# Patient Record
Sex: FEMALE | Race: WHITE | NOT HISPANIC OR LATINO | URBAN - METROPOLITAN AREA
[De-identification: names, ages, dates, MRNs, and addresses within clinical notes are randomized per-mention and may not be internally consistent; named-entity substitution may affect disease eponyms.]

---

## 2018-01-30 ENCOUNTER — FOLLOW UP ESTABLISHED (OUTPATIENT)
Dept: URBAN - METROPOLITAN AREA CLINIC 51 | Facility: CLINIC | Age: 68
End: 2018-01-30
Payer: MEDICARE

## 2018-01-30 DIAGNOSIS — H40.033 ANATOMICAL NARROW ANGLE, BILATERAL: ICD-10-CM

## 2018-01-30 DIAGNOSIS — H40.053 OCULAR HYPERTENSION, BILATERAL: ICD-10-CM

## 2018-01-30 DIAGNOSIS — H04.123 DRY EYE SYNDROME OF BILATERAL LACRIMAL GLANDS: ICD-10-CM

## 2018-01-30 PROCEDURE — 92134 CPTRZ OPH DX IMG PST SGM RTA: CPT | Performed by: OPTOMETRIST

## 2018-01-30 PROCEDURE — 92014 COMPRE OPH EXAM EST PT 1/>: CPT | Performed by: OPTOMETRIST

## 2018-01-30 PROCEDURE — 92083 EXTENDED VISUAL FIELD XM: CPT | Performed by: OPTOMETRIST

## 2018-01-30 RX ORDER — BRIMONIDINE TARTRATE 2 MG/ML
0.2 % SOLUTION/ DROPS OPHTHALMIC
Qty: 3 | Refills: 3 | Status: INACTIVE
Start: 2018-01-30 | End: 2018-04-06

## 2018-01-30 ASSESSMENT — INTRAOCULAR PRESSURE
OS: 21
OD: 27

## 2018-01-30 ASSESSMENT — VISUAL ACUITY
OD: 20/30
OS: 20/30

## 2018-01-30 ASSESSMENT — KERATOMETRY
OS: 45.27
OD: 46.17

## 2018-03-28 ENCOUNTER — CONSULT (OUTPATIENT)
Dept: URBAN - METROPOLITAN AREA CLINIC 51 | Facility: CLINIC | Age: 68
End: 2018-03-28
Payer: MEDICARE

## 2018-03-28 PROCEDURE — 67028 INJECTION EYE DRUG: CPT | Performed by: OPHTHALMOLOGY

## 2018-03-28 PROCEDURE — 92014 COMPRE OPH EXAM EST PT 1/>: CPT | Performed by: OPHTHALMOLOGY

## 2018-03-28 PROCEDURE — 92134 CPTRZ OPH DX IMG PST SGM RTA: CPT | Performed by: OPHTHALMOLOGY

## 2018-03-28 PROCEDURE — 92235 FLUORESCEIN ANGRPH MLTIFRAME: CPT | Performed by: OPHTHALMOLOGY

## 2018-03-28 ASSESSMENT — INTRAOCULAR PRESSURE
OS: 17
OD: 22

## 2018-04-06 ENCOUNTER — FOLLOW UP ESTABLISHED (OUTPATIENT)
Dept: URBAN - METROPOLITAN AREA CLINIC 44 | Facility: CLINIC | Age: 68
End: 2018-04-06
Payer: MEDICARE

## 2018-04-06 PROCEDURE — 92134 CPTRZ OPH DX IMG PST SGM RTA: CPT | Performed by: OPHTHALMOLOGY

## 2018-04-06 PROCEDURE — 67210 TREATMENT OF RETINAL LESION: CPT | Performed by: OPHTHALMOLOGY

## 2018-04-06 ASSESSMENT — INTRAOCULAR PRESSURE
OD: 17
OS: 12

## 2018-04-24 ENCOUNTER — FOLLOW UP ESTABLISHED (OUTPATIENT)
Dept: URBAN - METROPOLITAN AREA CLINIC 44 | Facility: CLINIC | Age: 68
End: 2018-04-24
Payer: MEDICARE

## 2018-04-24 PROCEDURE — 92134 CPTRZ OPH DX IMG PST SGM RTA: CPT | Performed by: OPHTHALMOLOGY

## 2018-04-24 PROCEDURE — 67028 INJECTION EYE DRUG: CPT | Performed by: OPHTHALMOLOGY

## 2018-04-24 ASSESSMENT — INTRAOCULAR PRESSURE
OD: 19
OS: 12

## 2018-07-10 ENCOUNTER — FOLLOW UP ESTABLISHED (OUTPATIENT)
Dept: URBAN - METROPOLITAN AREA CLINIC 44 | Facility: CLINIC | Age: 68
End: 2018-07-10
Payer: MEDICARE

## 2018-07-10 DIAGNOSIS — H25.13 AGE-RELATED NUCLEAR CATARACT, BILATERAL: ICD-10-CM

## 2018-07-10 PROCEDURE — 92235 FLUORESCEIN ANGRPH MLTIFRAME: CPT | Performed by: OPHTHALMOLOGY

## 2018-07-10 PROCEDURE — 92134 CPTRZ OPH DX IMG PST SGM RTA: CPT | Performed by: OPHTHALMOLOGY

## 2018-07-10 PROCEDURE — 92014 COMPRE OPH EXAM EST PT 1/>: CPT | Performed by: OPHTHALMOLOGY

## 2018-07-10 ASSESSMENT — INTRAOCULAR PRESSURE
OD: 15
OS: 14

## 2018-11-08 ENCOUNTER — FOLLOW UP ESTABLISHED (OUTPATIENT)
Dept: URBAN - METROPOLITAN AREA CLINIC 44 | Facility: CLINIC | Age: 68
End: 2018-11-08
Payer: MEDICARE

## 2018-11-08 PROCEDURE — 92134 CPTRZ OPH DX IMG PST SGM RTA: CPT | Performed by: OPHTHALMOLOGY

## 2018-11-08 PROCEDURE — 67028 INJECTION EYE DRUG: CPT | Performed by: OPHTHALMOLOGY

## 2018-11-08 PROCEDURE — 92235 FLUORESCEIN ANGRPH MLTIFRAME: CPT | Performed by: OPHTHALMOLOGY

## 2018-11-08 PROCEDURE — 92014 COMPRE OPH EXAM EST PT 1/>: CPT | Performed by: OPHTHALMOLOGY

## 2018-11-08 ASSESSMENT — INTRAOCULAR PRESSURE
OS: 17
OD: 19

## 2018-11-21 ENCOUNTER — FOLLOW UP ESTABLISHED (OUTPATIENT)
Dept: URBAN - METROPOLITAN AREA CLINIC 44 | Facility: CLINIC | Age: 68
End: 2018-11-21
Payer: MEDICARE

## 2018-11-21 PROCEDURE — 92134 CPTRZ OPH DX IMG PST SGM RTA: CPT | Performed by: OPHTHALMOLOGY

## 2018-11-21 PROCEDURE — 67210 TREATMENT OF RETINAL LESION: CPT | Performed by: OPHTHALMOLOGY

## 2018-11-21 ASSESSMENT — INTRAOCULAR PRESSURE
OS: 17
OD: 21

## 2019-01-21 ENCOUNTER — FOLLOW UP ESTABLISHED (OUTPATIENT)
Dept: URBAN - METROPOLITAN AREA CLINIC 44 | Facility: CLINIC | Age: 69
End: 2019-01-21
Payer: MEDICARE

## 2019-01-21 PROCEDURE — 92133 CPTRZD OPH DX IMG PST SGM ON: CPT | Performed by: OPTOMETRIST

## 2019-01-21 PROCEDURE — 92014 COMPRE OPH EXAM EST PT 1/>: CPT | Performed by: OPTOMETRIST

## 2019-01-21 RX ORDER — LATANOPROST 50 UG/ML
0.005 % SOLUTION OPHTHALMIC
Qty: 3 | Refills: 3 | Status: INACTIVE
Start: 2019-01-21 | End: 2020-02-26

## 2019-01-21 ASSESSMENT — KERATOMETRY
OS: 45.88
OD: 45.75

## 2019-01-21 ASSESSMENT — VISUAL ACUITY
OS: 20/40
OD: 20/40

## 2019-01-21 ASSESSMENT — INTRAOCULAR PRESSURE
OS: 28
OD: 31

## 2019-02-18 ENCOUNTER — FOLLOW UP ESTABLISHED (OUTPATIENT)
Dept: URBAN - METROPOLITAN AREA CLINIC 44 | Facility: CLINIC | Age: 69
End: 2019-02-18
Payer: MEDICARE

## 2019-02-18 DIAGNOSIS — H34.8322 TRIBUTARY (BRANCH) RETINAL VEIN OCCLUSION, LEFT EYE, STABLE: Primary | ICD-10-CM

## 2019-02-18 PROCEDURE — 92083 EXTENDED VISUAL FIELD XM: CPT | Performed by: OPTOMETRIST

## 2019-02-18 PROCEDURE — 92014 COMPRE OPH EXAM EST PT 1/>: CPT | Performed by: OPTOMETRIST

## 2019-02-18 PROCEDURE — 92250 FUNDUS PHOTOGRAPHY W/I&R: CPT | Performed by: OPTOMETRIST

## 2019-02-18 ASSESSMENT — VISUAL ACUITY
OD: 20/30
OS: 20/40

## 2019-02-18 ASSESSMENT — INTRAOCULAR PRESSURE
OS: 20
OD: 19

## 2019-02-18 ASSESSMENT — KERATOMETRY
OD: 45.50
OS: 45.38

## 2019-03-19 ENCOUNTER — FOLLOW UP ESTABLISHED (OUTPATIENT)
Dept: URBAN - METROPOLITAN AREA CLINIC 44 | Facility: CLINIC | Age: 69
End: 2019-03-19
Payer: MEDICARE

## 2019-03-19 PROCEDURE — 92235 FLUORESCEIN ANGRPH MLTIFRAME: CPT | Performed by: OPHTHALMOLOGY

## 2019-03-19 PROCEDURE — 92134 CPTRZ OPH DX IMG PST SGM RTA: CPT | Performed by: OPHTHALMOLOGY

## 2019-03-19 PROCEDURE — 67028 INJECTION EYE DRUG: CPT | Performed by: OPHTHALMOLOGY

## 2019-03-19 PROCEDURE — 92014 COMPRE OPH EXAM EST PT 1/>: CPT | Performed by: OPHTHALMOLOGY

## 2019-03-19 ASSESSMENT — INTRAOCULAR PRESSURE
OD: 17
OS: 18

## 2019-03-26 ENCOUNTER — FOLLOW UP ESTABLISHED (OUTPATIENT)
Dept: URBAN - METROPOLITAN AREA CLINIC 44 | Facility: CLINIC | Age: 69
End: 2019-03-26
Payer: MEDICARE

## 2019-03-26 PROCEDURE — 67210 TREATMENT OF RETINAL LESION: CPT | Performed by: OPHTHALMOLOGY

## 2019-03-26 PROCEDURE — 92134 CPTRZ OPH DX IMG PST SGM RTA: CPT | Performed by: OPHTHALMOLOGY

## 2019-03-26 ASSESSMENT — INTRAOCULAR PRESSURE
OS: 11
OD: 12

## 2019-04-05 ENCOUNTER — Encounter (OUTPATIENT)
Dept: URBAN - METROPOLITAN AREA CLINIC 44 | Facility: CLINIC | Age: 69
End: 2019-04-05
Payer: MEDICARE

## 2019-04-05 DIAGNOSIS — Z01.818 ENCOUNTER FOR OTHER PREPROCEDURAL EXAMINATION: Primary | ICD-10-CM

## 2019-04-05 PROCEDURE — 92025 CPTRIZED CORNEAL TOPOGRAPHY: CPT | Performed by: OPHTHALMOLOGY

## 2019-04-05 PROCEDURE — 99213 OFFICE O/P EST LOW 20 MIN: CPT | Performed by: PHYSICIAN ASSISTANT

## 2019-04-05 RX ORDER — ATORVASTATIN CALCIUM 10 MG/1
10 MG TABLET, FILM COATED ORAL
Qty: 0 | Refills: 0 | Status: INACTIVE
Start: 2019-04-05 | End: 2019-04-05

## 2019-04-08 ENCOUNTER — FOLLOW UP ESTABLISHED (OUTPATIENT)
Dept: URBAN - METROPOLITAN AREA CLINIC 44 | Facility: CLINIC | Age: 69
End: 2019-04-08
Payer: MEDICARE

## 2019-04-08 DIAGNOSIS — H52.223 REGULAR ASTIGMATISM, BILATERAL: ICD-10-CM

## 2019-04-08 PROCEDURE — 92020 GONIOSCOPY: CPT | Performed by: OPHTHALMOLOGY

## 2019-04-08 PROCEDURE — 92012 INTRM OPH EXAM EST PATIENT: CPT | Performed by: OPHTHALMOLOGY

## 2019-04-08 RX ORDER — PREDNISOLONE ACETATE 10 MG/ML
1 % SUSPENSION/ DROPS OPHTHALMIC
Qty: 1 | Refills: 2 | Status: INACTIVE
Start: 2019-04-08 | End: 2020-02-19

## 2019-04-08 RX ORDER — DICLOFENAC SODIUM 1 MG/ML
0.1 % SOLUTION/ DROPS OPHTHALMIC
Qty: 1 | Refills: 2 | Status: INACTIVE
Start: 2019-04-08 | End: 2020-02-19

## 2019-04-08 ASSESSMENT — INTRAOCULAR PRESSURE
OD: 17
OS: 17

## 2019-04-11 ENCOUNTER — SURGERY (OUTPATIENT)
Dept: URBAN - METROPOLITAN AREA SURGERY 19 | Facility: SURGERY | Age: 69
End: 2019-04-11
Payer: MEDICARE

## 2019-04-11 PROCEDURE — 66984 XCAPSL CTRC RMVL W/O ECP: CPT | Performed by: OPHTHALMOLOGY

## 2019-04-12 ENCOUNTER — POST OP (OUTPATIENT)
Dept: URBAN - METROPOLITAN AREA CLINIC 44 | Facility: CLINIC | Age: 69
End: 2019-04-12

## 2019-04-12 DIAGNOSIS — Z96.1 PRESENCE OF INTRAOCULAR LENS: Primary | ICD-10-CM

## 2019-04-12 PROCEDURE — 99024 POSTOP FOLLOW-UP VISIT: CPT | Performed by: OPTOMETRIST

## 2019-04-12 ASSESSMENT — INTRAOCULAR PRESSURE: OS: 12

## 2019-04-18 ENCOUNTER — POST OP (OUTPATIENT)
Dept: URBAN - METROPOLITAN AREA CLINIC 44 | Facility: CLINIC | Age: 69
End: 2019-04-18

## 2019-04-18 PROCEDURE — 99024 POSTOP FOLLOW-UP VISIT: CPT | Performed by: OPTOMETRIST

## 2019-04-18 ASSESSMENT — VISUAL ACUITY
OD: 20/30
OS: 20/25

## 2019-04-18 ASSESSMENT — INTRAOCULAR PRESSURE
OD: 11
OS: 11

## 2019-04-25 ENCOUNTER — SURGERY (OUTPATIENT)
Dept: URBAN - METROPOLITAN AREA SURGERY 19 | Facility: SURGERY | Age: 69
End: 2019-04-25
Payer: MEDICARE

## 2019-04-25 PROCEDURE — 66984 XCAPSL CTRC RMVL W/O ECP: CPT | Performed by: OPHTHALMOLOGY

## 2019-04-26 ENCOUNTER — POST OP (OUTPATIENT)
Dept: URBAN - METROPOLITAN AREA CLINIC 44 | Facility: CLINIC | Age: 69
End: 2019-04-26

## 2019-04-26 PROCEDURE — 99024 POSTOP FOLLOW-UP VISIT: CPT | Performed by: OPTOMETRIST

## 2019-04-26 ASSESSMENT — INTRAOCULAR PRESSURE: OD: 14

## 2019-06-13 ENCOUNTER — POST OP (OUTPATIENT)
Dept: URBAN - METROPOLITAN AREA CLINIC 44 | Facility: CLINIC | Age: 69
End: 2019-06-13

## 2019-06-13 PROCEDURE — 99024 POSTOP FOLLOW-UP VISIT: CPT | Performed by: OPTOMETRIST

## 2019-06-13 PROCEDURE — 92015 DETERMINE REFRACTIVE STATE: CPT | Performed by: OPTOMETRIST

## 2019-06-13 ASSESSMENT — INTRAOCULAR PRESSURE
OD: 21
OS: 18

## 2019-06-13 ASSESSMENT — VISUAL ACUITY
OS: 20/30
OD: 20/25

## 2019-08-06 ENCOUNTER — FOLLOW UP ESTABLISHED (OUTPATIENT)
Dept: URBAN - METROPOLITAN AREA CLINIC 44 | Facility: CLINIC | Age: 69
End: 2019-08-06
Payer: MEDICARE

## 2019-08-06 PROCEDURE — 92134 CPTRZ OPH DX IMG PST SGM RTA: CPT | Performed by: OPHTHALMOLOGY

## 2019-08-06 PROCEDURE — 92235 FLUORESCEIN ANGRPH MLTIFRAME: CPT | Performed by: OPHTHALMOLOGY

## 2019-08-06 PROCEDURE — 67210 TREATMENT OF RETINAL LESION: CPT | Performed by: OPHTHALMOLOGY

## 2019-08-06 PROCEDURE — 67028 INJECTION EYE DRUG: CPT | Performed by: OPHTHALMOLOGY

## 2019-08-06 ASSESSMENT — INTRAOCULAR PRESSURE
OS: 9
OD: 11

## 2020-01-14 ENCOUNTER — FOLLOW UP ESTABLISHED (OUTPATIENT)
Dept: URBAN - METROPOLITAN AREA CLINIC 44 | Facility: CLINIC | Age: 70
End: 2020-01-14
Payer: MEDICARE

## 2020-01-14 PROCEDURE — 67028 INJECTION EYE DRUG: CPT | Performed by: OPHTHALMOLOGY

## 2020-01-14 PROCEDURE — 67210 TREATMENT OF RETINAL LESION: CPT | Performed by: OPHTHALMOLOGY

## 2020-01-14 PROCEDURE — 92134 CPTRZ OPH DX IMG PST SGM RTA: CPT | Performed by: OPHTHALMOLOGY

## 2020-01-14 PROCEDURE — 92235 FLUORESCEIN ANGRPH MLTIFRAME: CPT | Performed by: OPHTHALMOLOGY

## 2020-01-14 ASSESSMENT — INTRAOCULAR PRESSURE
OS: 12
OD: 12

## 2020-02-19 ENCOUNTER — FOLLOW UP ESTABLISHED (OUTPATIENT)
Dept: URBAN - METROPOLITAN AREA CLINIC 44 | Facility: CLINIC | Age: 70
End: 2020-02-19
Payer: MEDICARE

## 2020-02-19 DIAGNOSIS — H02.834 DERMATOCHALASIS OF LEFT UPPER LID: ICD-10-CM

## 2020-02-19 DIAGNOSIS — H02.831 DERMATOCHALASIS OF RIGHT UPPER LID: ICD-10-CM

## 2020-02-19 PROCEDURE — 92014 COMPRE OPH EXAM EST PT 1/>: CPT | Performed by: OPTOMETRIST

## 2020-02-19 PROCEDURE — 92133 CPTRZD OPH DX IMG PST SGM ON: CPT | Performed by: OPTOMETRIST

## 2020-02-19 ASSESSMENT — INTRAOCULAR PRESSURE
OS: 18
OD: 18

## 2020-02-19 ASSESSMENT — KERATOMETRY
OD: 45.50
OS: 45.75

## 2020-02-19 ASSESSMENT — VISUAL ACUITY
OS: 20/125
OD: 20/25

## 2020-04-03 ENCOUNTER — FOLLOW UP ESTABLISHED (OUTPATIENT)
Dept: URBAN - METROPOLITAN AREA CLINIC 44 | Facility: CLINIC | Age: 70
End: 2020-04-03
Payer: MEDICARE

## 2020-04-03 DIAGNOSIS — H26.493 OTHER SECONDARY CATARACT, BILATERAL: ICD-10-CM

## 2020-04-03 PROCEDURE — 92014 COMPRE OPH EXAM EST PT 1/>: CPT | Performed by: OPHTHALMOLOGY

## 2020-04-03 PROCEDURE — 67028 INJECTION EYE DRUG: CPT | Performed by: OPHTHALMOLOGY

## 2020-04-03 PROCEDURE — 92235 FLUORESCEIN ANGRPH MLTIFRAME: CPT | Performed by: OPHTHALMOLOGY

## 2020-04-03 PROCEDURE — 92134 CPTRZ OPH DX IMG PST SGM RTA: CPT | Performed by: OPHTHALMOLOGY

## 2020-04-03 ASSESSMENT — INTRAOCULAR PRESSURE
OS: 16
OD: 16

## 2020-04-21 ENCOUNTER — FOLLOW UP ESTABLISHED (OUTPATIENT)
Dept: URBAN - METROPOLITAN AREA CLINIC 44 | Facility: CLINIC | Age: 70
End: 2020-04-21
Payer: MEDICARE

## 2020-04-21 DIAGNOSIS — H34.8320 TRIB RTNL VEIN OCCLUSION, LEFT EYE, WITH MACULAR EDEMA: Primary | ICD-10-CM

## 2020-04-21 PROCEDURE — 67210 TREATMENT OF RETINAL LESION: CPT | Performed by: OPHTHALMOLOGY

## 2020-04-21 PROCEDURE — 92134 CPTRZ OPH DX IMG PST SGM RTA: CPT | Performed by: OPHTHALMOLOGY

## 2020-04-21 ASSESSMENT — INTRAOCULAR PRESSURE
OS: 16
OD: 14

## 2020-08-04 ENCOUNTER — FOLLOW UP ESTABLISHED (OUTPATIENT)
Dept: URBAN - METROPOLITAN AREA CLINIC 44 | Facility: CLINIC | Age: 70
End: 2020-08-04
Payer: MEDICARE

## 2020-08-04 DIAGNOSIS — H40.023 OPEN ANGLE WITH BORDERLINE FINDINGS, HIGH RISK, BILATERAL: ICD-10-CM

## 2020-08-04 PROCEDURE — 67028 INJECTION EYE DRUG: CPT | Performed by: OPHTHALMOLOGY

## 2020-08-04 PROCEDURE — 92134 CPTRZ OPH DX IMG PST SGM RTA: CPT | Performed by: OPHTHALMOLOGY

## 2020-08-04 PROCEDURE — 67210 TREATMENT OF RETINAL LESION: CPT | Performed by: OPHTHALMOLOGY

## 2020-08-04 PROCEDURE — 92235 FLUORESCEIN ANGRPH MLTIFRAME: CPT | Performed by: OPHTHALMOLOGY

## 2020-08-04 ASSESSMENT — INTRAOCULAR PRESSURE
OS: 17
OD: 19

## 2020-11-18 ENCOUNTER — FOLLOW UP ESTABLISHED (OUTPATIENT)
Dept: URBAN - METROPOLITAN AREA CLINIC 44 | Facility: CLINIC | Age: 70
End: 2020-11-18
Payer: MEDICARE

## 2020-11-18 PROCEDURE — 92134 CPTRZ OPH DX IMG PST SGM RTA: CPT | Performed by: OPHTHALMOLOGY

## 2020-11-18 PROCEDURE — 67028 INJECTION EYE DRUG: CPT | Performed by: OPHTHALMOLOGY

## 2020-11-18 PROCEDURE — 92235 FLUORESCEIN ANGRPH MLTIFRAME: CPT | Performed by: OPHTHALMOLOGY

## 2020-11-18 PROCEDURE — 67210 TREATMENT OF RETINAL LESION: CPT | Performed by: OPHTHALMOLOGY

## 2020-11-18 ASSESSMENT — INTRAOCULAR PRESSURE
OD: 17
OS: 14

## 2021-02-09 ENCOUNTER — FOLLOW UP ESTABLISHED (OUTPATIENT)
Dept: URBAN - METROPOLITAN AREA CLINIC 44 | Facility: CLINIC | Age: 71
End: 2021-02-09
Payer: MEDICARE

## 2021-02-09 PROCEDURE — 92235 FLUORESCEIN ANGRPH MLTIFRAME: CPT | Performed by: OPHTHALMOLOGY

## 2021-02-09 PROCEDURE — 99214 OFFICE O/P EST MOD 30 MIN: CPT | Performed by: OPHTHALMOLOGY

## 2021-02-09 PROCEDURE — 92134 CPTRZ OPH DX IMG PST SGM RTA: CPT | Performed by: OPHTHALMOLOGY

## 2021-02-09 ASSESSMENT — INTRAOCULAR PRESSURE
OS: 14
OD: 17

## 2021-09-30 ENCOUNTER — OFFICE VISIT (OUTPATIENT)
Dept: URBAN - METROPOLITAN AREA CLINIC 44 | Facility: CLINIC | Age: 71
End: 2021-09-30
Payer: MEDICARE

## 2021-09-30 PROCEDURE — 92134 CPTRZ OPH DX IMG PST SGM RTA: CPT | Performed by: OPHTHALMOLOGY

## 2021-09-30 PROCEDURE — 92235 FLUORESCEIN ANGRPH MLTIFRAME: CPT | Performed by: OPHTHALMOLOGY

## 2021-09-30 PROCEDURE — 99214 OFFICE O/P EST MOD 30 MIN: CPT | Performed by: OPHTHALMOLOGY

## 2021-09-30 ASSESSMENT — INTRAOCULAR PRESSURE
OS: 12
OD: 14

## 2021-09-30 NOTE — IMPRESSION/PLAN
Impression: Open Angle, OU.  Plan: continue with Dr. Alea Turcios.
- Latanoprost [facility], OU.

## 2021-09-30 NOTE — IMPRESSION/PLAN
Impression: Tributary (branch) retinal vein occlusion, left eye, with macular edema: Z89.1031. Plan: eval and testing confirms longstanding BRVO, now with significant CME. explained in detail with patient, gave option of observation vs injection tx. guarded visual prognosis due to severity of condition. pt elects to consider options, will call if wanting to proceed with tx, otherwise will cont with annual checks RTC 1 yr

## 2022-05-24 ENCOUNTER — OFFICE VISIT (OUTPATIENT)
Dept: URBAN - METROPOLITAN AREA CLINIC 44 | Facility: CLINIC | Age: 72
End: 2022-05-24
Payer: MEDICARE

## 2022-05-24 DIAGNOSIS — H40.023 OPEN ANGLE WITH BORDERLINE FINDINGS, HIGH RISK, BILATERAL: ICD-10-CM

## 2022-05-24 DIAGNOSIS — H34.8320 TRIBUTARY (BRANCH) RETINAL VEIN OCCLUSION, LEFT EYE, WITH MACULAR EDEMA: Primary | ICD-10-CM

## 2022-05-24 PROCEDURE — 99214 OFFICE O/P EST MOD 30 MIN: CPT | Performed by: OPHTHALMOLOGY

## 2022-05-24 PROCEDURE — 92235 FLUORESCEIN ANGRPH MLTIFRAME: CPT | Performed by: OPHTHALMOLOGY

## 2022-05-24 PROCEDURE — 92134 CPTRZ OPH DX IMG PST SGM RTA: CPT | Performed by: OPHTHALMOLOGY

## 2022-05-24 ASSESSMENT — INTRAOCULAR PRESSURE
OD: 10
OS: 6

## 2022-05-24 NOTE — IMPRESSION/PLAN
Impression: Open Angle, OU. Plan: See  Dr. Uriel Baptiste., for baseline and follow up - Latanoprost QHS, OU.

## 2022-05-24 NOTE — IMPRESSION/PLAN
Impression: Branch Retinal Vein Occlusion, OS. Plan: Exam, OCT, and FA reveal a BRVO, sup-temp. No further treatment due to level of vision.

## 2022-09-20 ENCOUNTER — OFFICE VISIT (OUTPATIENT)
Dept: URBAN - METROPOLITAN AREA CLINIC 44 | Facility: CLINIC | Age: 72
End: 2022-09-20
Payer: MEDICARE

## 2022-09-20 DIAGNOSIS — H40.1134 PRIMARY OPEN-ANGLE GLAUCOMA, BILATERAL, INDETERMINATE STAGE: ICD-10-CM

## 2022-09-20 DIAGNOSIS — H40.023 OPEN ANGLE WITH BORDERLINE FINDINGS, HIGH RISK, BILATERAL: Primary | ICD-10-CM

## 2022-09-20 PROCEDURE — 92083 EXTENDED VISUAL FIELD XM: CPT | Performed by: OPTOMETRIST

## 2022-09-20 PROCEDURE — 99213 OFFICE O/P EST LOW 20 MIN: CPT | Performed by: OPTOMETRIST

## 2022-09-20 ASSESSMENT — INTRAOCULAR PRESSURE
OS: 20
OD: 19
OS: 18
OD: 20

## 2022-09-20 ASSESSMENT — KERATOMETRY
OS: 45.75
OD: 45.75

## 2022-09-20 NOTE — IMPRESSION/PLAN
Impression: Open Angle, OU. Plan: Small CDR, healthy rim OU Family hx:
Pachymetry: 959/989 IOP: 19/18 Current meds: latanoprost QHS OU
OCT RNFL (02/19/20): wnl OU
HVF (09/20/22): OD mild scatter (unreliable) OS inferior defect (unreliable) -- hx of BRVO

IOP okay. Continue current meds.   RTC 6 months for Complete Exam + OCT RNFL

## 2023-03-20 ENCOUNTER — OFFICE VISIT (OUTPATIENT)
Dept: URBAN - METROPOLITAN AREA CLINIC 44 | Facility: CLINIC | Age: 73
End: 2023-03-20
Payer: MEDICARE

## 2023-03-20 DIAGNOSIS — H26.493 OTHER SECONDARY CATARACT, BILATERAL: ICD-10-CM

## 2023-03-20 DIAGNOSIS — H04.123 TEAR FILM INSUFFICIENCY OF BILATERAL LACRIMAL GLANDS: Primary | ICD-10-CM

## 2023-03-20 DIAGNOSIS — H40.023 OPEN ANGLE WITH BORDERLINE FINDINGS, HIGH RISK, BILATERAL: ICD-10-CM

## 2023-03-20 DIAGNOSIS — H34.8320 TRIBUTARY (BRANCH) RETINAL VEIN OCCLUSION, LEFT EYE, WITH MACULAR EDEMA: ICD-10-CM

## 2023-03-20 PROCEDURE — 99214 OFFICE O/P EST MOD 30 MIN: CPT | Performed by: OPTOMETRIST

## 2023-03-20 PROCEDURE — 92133 CPTRZD OPH DX IMG PST SGM ON: CPT | Performed by: OPTOMETRIST

## 2023-03-20 PROCEDURE — 92134 CPTRZ OPH DX IMG PST SGM RTA: CPT | Performed by: OPTOMETRIST

## 2023-03-20 RX ORDER — LATANOPROST 50 UG/ML
0.005 % SOLUTION OPHTHALMIC
Qty: 7.5 | Refills: 3 | Status: ACTIVE
Start: 2023-03-20

## 2023-03-20 ASSESSMENT — VISUAL ACUITY
OD: 20/30
OS: CF 3FT

## 2023-03-20 ASSESSMENT — KERATOMETRY
OD: 45.75
OS: 45.63

## 2023-03-20 ASSESSMENT — INTRAOCULAR PRESSURE
OS: 19
OD: 19

## 2023-03-20 NOTE — IMPRESSION/PLAN
Impression: Open Angle, OU. Plan: Small CDR, healthy rim OU Family hx:
Pachymetry: 075/080 IOP: 19/19 Current meds: latanoprost QHS OU
OCT RNFL (03/20/23): wnl OU
HVF (09/20/22): OD mild scatter (unreliable) OS inferior defect (unreliable) -- hx of BRVO

IOP okay. OCT stable OU. Continue current meds.   RTC 6 months for IOP + 24-2 HVF

## 2023-03-20 NOTE — IMPRESSION/PLAN
Impression: Branch Retinal Vein Occlusion, OS. Plan: Stable appearance/CME. Follow up with Dr. Tano Lopes as scheduled.

## 2023-03-20 NOTE — IMPRESSION/PLAN
Impression: Other secondary cataract, bilateral: H26.493. Plan: Opacified capsule with option for YAG laser Capsulotomy. Discussed procedure, risks, benefits and side effects. Patient declined YAG OS. Patient requests YAG Capsulotomy OD only.

## 2023-03-31 ENCOUNTER — SURGERY (OUTPATIENT)
Dept: URBAN - METROPOLITAN AREA SURGERY 19 | Facility: SURGERY | Age: 73
End: 2023-03-31
Payer: MEDICARE

## 2023-03-31 PROCEDURE — 66821 AFTER CATARACT LASER SURGERY: CPT | Performed by: OPHTHALMOLOGY

## 2023-05-30 ENCOUNTER — OFFICE VISIT (OUTPATIENT)
Dept: URBAN - METROPOLITAN AREA CLINIC 44 | Facility: CLINIC | Age: 73
End: 2023-05-30
Payer: MEDICARE

## 2023-05-30 DIAGNOSIS — H34.8320 TRIBUTARY (BRANCH) RETINAL VEIN OCCLUSION, LEFT EYE, WITH MACULAR EDEMA: Primary | ICD-10-CM

## 2023-05-30 PROCEDURE — 92134 CPTRZ OPH DX IMG PST SGM RTA: CPT | Performed by: OPHTHALMOLOGY

## 2023-05-30 PROCEDURE — 99214 OFFICE O/P EST MOD 30 MIN: CPT | Performed by: OPHTHALMOLOGY

## 2023-05-30 PROCEDURE — 92235 FLUORESCEIN ANGRPH MLTIFRAME: CPT | Performed by: OPHTHALMOLOGY

## 2023-05-30 RX ORDER — LATANOPROST 50 UG/ML
0.005 % SOLUTION OPHTHALMIC
Qty: 7.5 | Refills: 3 | Status: ACTIVE
Start: 2023-05-30

## 2023-05-30 ASSESSMENT — INTRAOCULAR PRESSURE
OS: 16
OD: 14

## 2023-05-30 NOTE — IMPRESSION/PLAN
Impression: Branch Retinal Vein Occlusion, OS. Plan: Exam, OCT, and FA reveal a BRVO, with CME. Inject Avastin, as necessary. Past SDM MicroPulse laser [id number].

## 2023-09-21 ENCOUNTER — OFFICE VISIT (OUTPATIENT)
Dept: URBAN - METROPOLITAN AREA CLINIC 44 | Facility: CLINIC | Age: 73
End: 2023-09-21
Payer: MEDICARE

## 2023-09-21 DIAGNOSIS — H40.023 OPEN ANGLE WITH BORDERLINE FINDINGS, HIGH RISK, BILATERAL: Primary | ICD-10-CM

## 2023-09-21 PROCEDURE — 99213 OFFICE O/P EST LOW 20 MIN: CPT | Performed by: OPTOMETRIST

## 2023-09-21 PROCEDURE — 92083 EXTENDED VISUAL FIELD XM: CPT | Performed by: OPTOMETRIST

## 2023-09-21 RX ORDER — LATANOPROST 50 UG/ML
0.005 % SOLUTION OPHTHALMIC
Qty: 7.5 | Refills: 3 | Status: ACTIVE
Start: 2023-09-21

## 2023-09-21 ASSESSMENT — INTRAOCULAR PRESSURE
OD: 11
OS: 11

## 2024-02-16 ENCOUNTER — OFFICE VISIT (OUTPATIENT)
Dept: URBAN - METROPOLITAN AREA CLINIC 44 | Facility: CLINIC | Age: 74
End: 2024-02-16
Payer: MEDICARE

## 2024-02-16 DIAGNOSIS — H34.8320 TRIBUTARY (BRANCH) RETINAL VEIN OCCLUSION, LEFT EYE, WITH MACULAR EDEMA: Primary | ICD-10-CM

## 2024-02-16 PROCEDURE — 92235 FLUORESCEIN ANGRPH MLTIFRAME: CPT | Performed by: OPHTHALMOLOGY

## 2024-02-16 PROCEDURE — 99214 OFFICE O/P EST MOD 30 MIN: CPT | Performed by: OPHTHALMOLOGY

## 2024-02-16 PROCEDURE — 92134 CPTRZ OPH DX IMG PST SGM RTA: CPT | Performed by: OPHTHALMOLOGY

## 2024-02-16 PROCEDURE — 67028 INJECTION EYE DRUG: CPT | Performed by: OPHTHALMOLOGY

## 2024-02-16 ASSESSMENT — INTRAOCULAR PRESSURE
OD: 14
OS: 14

## 2024-03-15 ENCOUNTER — OFFICE VISIT (OUTPATIENT)
Dept: URBAN - METROPOLITAN AREA CLINIC 44 | Facility: CLINIC | Age: 74
End: 2024-03-15
Payer: MEDICARE

## 2024-03-15 DIAGNOSIS — H34.8320 TRIBUTARY (BRANCH) RETINAL VEIN OCCLUSION, LEFT EYE, WITH MACULAR EDEMA: Primary | ICD-10-CM

## 2024-03-15 PROCEDURE — 92134 CPTRZ OPH DX IMG PST SGM RTA: CPT | Performed by: OPHTHALMOLOGY

## 2024-03-15 PROCEDURE — 67028 INJECTION EYE DRUG: CPT | Performed by: OPHTHALMOLOGY

## 2024-03-15 PROCEDURE — 99212 OFFICE O/P EST SF 10 MIN: CPT | Performed by: OPHTHALMOLOGY

## 2024-03-15 ASSESSMENT — INTRAOCULAR PRESSURE
OD: 15
OS: 12

## 2024-05-14 ENCOUNTER — OFFICE VISIT (OUTPATIENT)
Dept: URBAN - METROPOLITAN AREA CLINIC 44 | Facility: CLINIC | Age: 74
End: 2024-05-14
Payer: MEDICARE

## 2024-05-14 DIAGNOSIS — H40.023 OPEN ANGLE WITH BORDERLINE FINDINGS, HIGH RISK, BILATERAL: ICD-10-CM

## 2024-05-14 DIAGNOSIS — H34.8320 TRIBUTARY (BRANCH) RETINAL VEIN OCCLUSION, LEFT EYE, WITH MACULAR EDEMA: ICD-10-CM

## 2024-05-14 DIAGNOSIS — H26.492 OTHER SECONDARY CATARACT, LEFT EYE: ICD-10-CM

## 2024-05-14 DIAGNOSIS — H04.123 TEAR FILM INSUFFICIENCY OF BILATERAL LACRIMAL GLANDS: Primary | ICD-10-CM

## 2024-05-14 PROCEDURE — 99214 OFFICE O/P EST MOD 30 MIN: CPT | Performed by: OPTOMETRIST

## 2024-05-14 PROCEDURE — 92133 CPTRZD OPH DX IMG PST SGM ON: CPT | Performed by: OPTOMETRIST

## 2024-05-14 PROCEDURE — 92134 CPTRZ OPH DX IMG PST SGM RTA: CPT | Performed by: OPTOMETRIST

## 2024-05-14 ASSESSMENT — INTRAOCULAR PRESSURE
OS: 15
OD: 15

## 2024-05-14 ASSESSMENT — VISUAL ACUITY
OS: CF 3FT
OD: 20/20

## 2024-05-14 ASSESSMENT — KERATOMETRY
OD: 45.50
OS: 45.88

## 2024-06-18 ENCOUNTER — OFFICE VISIT (OUTPATIENT)
Dept: URBAN - METROPOLITAN AREA CLINIC 44 | Facility: CLINIC | Age: 74
End: 2024-06-18
Payer: MEDICARE

## 2024-06-18 DIAGNOSIS — H34.8320 TRIBUTARY (BRANCH) RETINAL VEIN OCCLUSION, LEFT EYE, WITH MACULAR EDEMA: Primary | ICD-10-CM

## 2024-06-18 PROCEDURE — 67028 INJECTION EYE DRUG: CPT | Performed by: OPHTHALMOLOGY

## 2024-06-18 PROCEDURE — 92134 CPTRZ OPH DX IMG PST SGM RTA: CPT | Performed by: OPHTHALMOLOGY

## 2024-06-18 PROCEDURE — 99213 OFFICE O/P EST LOW 20 MIN: CPT | Performed by: OPHTHALMOLOGY

## 2024-06-18 ASSESSMENT — INTRAOCULAR PRESSURE
OS: 13
OD: 13

## 2024-10-22 ENCOUNTER — OFFICE VISIT (OUTPATIENT)
Dept: URBAN - METROPOLITAN AREA CLINIC 44 | Facility: CLINIC | Age: 74
End: 2024-10-22
Payer: MEDICARE

## 2024-10-22 DIAGNOSIS — H34.8320 TRIBUTARY (BRANCH) RETINAL VEIN OCCLUSION, LEFT EYE, WITH MACULAR EDEMA: Primary | ICD-10-CM

## 2024-10-22 PROCEDURE — 67028 INJECTION EYE DRUG: CPT | Performed by: OPHTHALMOLOGY

## 2024-10-22 PROCEDURE — 99214 OFFICE O/P EST MOD 30 MIN: CPT | Performed by: OPHTHALMOLOGY

## 2024-10-22 PROCEDURE — 92134 CPTRZ OPH DX IMG PST SGM RTA: CPT | Performed by: OPHTHALMOLOGY

## 2024-10-22 ASSESSMENT — INTRAOCULAR PRESSURE
OD: 21
OS: 20

## 2025-01-20 ENCOUNTER — OFFICE VISIT (OUTPATIENT)
Dept: URBAN - METROPOLITAN AREA CLINIC 44 | Facility: CLINIC | Age: 75
End: 2025-01-20
Payer: MEDICARE

## 2025-01-20 DIAGNOSIS — H40.023 OPEN ANGLE WITH BORDERLINE FINDINGS, HIGH RISK, BILATERAL: Primary | ICD-10-CM

## 2025-01-20 PROCEDURE — 92083 EXTENDED VISUAL FIELD XM: CPT | Performed by: OPTOMETRIST

## 2025-01-20 PROCEDURE — 99213 OFFICE O/P EST LOW 20 MIN: CPT | Performed by: OPTOMETRIST

## 2025-01-20 RX ORDER — LATANOPROST 50 UG/ML
0.005 % SOLUTION OPHTHALMIC
Qty: 7.5 | Refills: 3 | Status: ACTIVE
Start: 2025-01-20

## 2025-01-20 ASSESSMENT — INTRAOCULAR PRESSURE
OS: 15
OD: 15

## 2025-04-29 ENCOUNTER — OFFICE VISIT (OUTPATIENT)
Dept: URBAN - METROPOLITAN AREA CLINIC 44 | Facility: CLINIC | Age: 75
End: 2025-04-29
Payer: MEDICARE

## 2025-04-29 DIAGNOSIS — H34.8320 TRIBUTARY (BRANCH) RETINAL VEIN OCCLUSION, LEFT EYE, WITH MACULAR EDEMA: Primary | ICD-10-CM

## 2025-04-29 PROCEDURE — 92134 CPTRZ OPH DX IMG PST SGM RTA: CPT | Performed by: OPHTHALMOLOGY

## 2025-04-29 PROCEDURE — 99214 OFFICE O/P EST MOD 30 MIN: CPT | Performed by: OPHTHALMOLOGY

## 2025-04-29 PROCEDURE — 67028 INJECTION EYE DRUG: CPT | Performed by: OPHTHALMOLOGY

## 2025-04-29 ASSESSMENT — INTRAOCULAR PRESSURE
OD: 12
OS: 10